# Patient Record
Sex: FEMALE | Race: WHITE | NOT HISPANIC OR LATINO | Employment: FULL TIME | URBAN - METROPOLITAN AREA
[De-identification: names, ages, dates, MRNs, and addresses within clinical notes are randomized per-mention and may not be internally consistent; named-entity substitution may affect disease eponyms.]

---

## 2018-07-28 ENCOUNTER — TELEPHONE (OUTPATIENT)
Dept: INTERNAL MEDICINE | Facility: CLINIC | Age: 33
End: 2018-07-28

## 2018-07-28 NOTE — TELEPHONE ENCOUNTER
PT CALLED IN REQUESTING TO LEAVE A MESSAGE FOR DR. ESPINAL OR LOLA MARTIN. THE PT HAS A NEW PT APT FOR DR. ESPINAL 08/14. THE PT IS WANTING TO SEE IF HER LAB ORDERS COULD BE DONE AT [facility] OR ANOTHER LOCATION TO HAVE THEM DONE PRIOR TO THE APT. PT ALSO WANTED TO SEE IN ADDITION TO THE LABS THAT ARE TO BE DRAWN, IF AN ORDER FOR C REACTIVE PROTEIN LEVEL COULD BE ADDED OR HOW TO GO ABOUT ADDING THAT LAB. BEST CALL BACK # [phone]

## 2018-08-14 ENCOUNTER — OFFICE VISIT (OUTPATIENT)
Dept: ENDOCRINOLOGY | Facility: CLINIC | Age: 33
End: 2018-08-14

## 2018-08-14 VITALS
HEART RATE: 56 BPM | WEIGHT: 163.8 LBS | HEIGHT: 65 IN | BODY MASS INDEX: 27.29 KG/M2 | DIASTOLIC BLOOD PRESSURE: 76 MMHG | OXYGEN SATURATION: 99 % | SYSTOLIC BLOOD PRESSURE: 116 MMHG

## 2018-08-14 DIAGNOSIS — R79.82 ELEVATED C-REACTIVE PROTEIN (CRP): ICD-10-CM

## 2018-08-14 DIAGNOSIS — E03.9 HYPOTHYROIDISM, ADULT: Primary | ICD-10-CM

## 2018-08-14 LAB
CRP SERPL-MCNC: 0.58 MG/DL (ref 0–1)
ERYTHROCYTE [SEDIMENTATION RATE] IN BLOOD: 14 MM/HR (ref 0–20)
T4 FREE SERPL-MCNC: 0.92 NG/DL (ref 0.89–1.76)
TSH SERPL DL<=0.05 MIU/L-ACNC: 2.94 MIU/ML (ref 0.35–5.35)

## 2018-08-14 PROCEDURE — 84439 ASSAY OF FREE THYROXINE: CPT | Performed by: INTERNAL MEDICINE

## 2018-08-14 PROCEDURE — 99204 OFFICE O/P NEW MOD 45 MIN: CPT | Performed by: INTERNAL MEDICINE

## 2018-08-14 PROCEDURE — 86140 C-REACTIVE PROTEIN: CPT | Performed by: INTERNAL MEDICINE

## 2018-08-14 PROCEDURE — 84443 ASSAY THYROID STIM HORMONE: CPT | Performed by: INTERNAL MEDICINE

## 2018-08-14 PROCEDURE — 85652 RBC SED RATE AUTOMATED: CPT | Performed by: INTERNAL MEDICINE

## 2018-08-14 PROCEDURE — 84480 ASSAY TRIIODOTHYRONINE (T3): CPT | Performed by: INTERNAL MEDICINE

## 2018-08-14 RX ORDER — FEXOFENADINE HCL 180 MG/1
180 TABLET ORAL DAILY
COMMUNITY

## 2018-08-14 NOTE — PROGRESS NOTES
Subjective:   Hypothyroidism (Initial )        Yasmeen Lacey is a 33 y.o. female who is here today as a self referral.   Hashimoto thyroiditis dx in 2010. She has seen DR Pickard, started levothyroxine  mcg and was on the same dose for a while.   Up until last year she took Levothyroxine 100 µg daily. She has seen Dr. Pickard and no changes in medication for several years. Last TSH was 0.96.   2017 she has switched to Naturethroid and later tp NP thyroid 90 mg.   She reported feeling best on Naturethroid.   Patient complains of fatigue, difficulty to loose weight despite exercising regularly and watching calories.     Her last visit with PCP ([facility]) she had elevated CRP and asked ot have this repeated.      Review of Systems  Review of Systems   Constitutional: Positive for fatigue and unexpected weight loss (difficulty to loose weight).   Gastrointestinal: Negative for constipation.   Genitourinary: Negative for menstrual problem.   Musculoskeletal: Negative for arthralgias.   All other systems reviewed and are negative.    Current medications:  Current Outpatient Prescriptions   Medication Sig Dispense Refill   • fexofenadine (ALLEGRA) 180 MG tablet Take 180 mg by mouth Daily.     • Thyroid (LEVOTHYROXINE-LIOTHYRONINE) 90 MG tablet        No current facility-administered medications for this visit.        The following portions of the patient's history were reviewed and updated as appropriate: She  has a past medical history of Hypothyroidism.  She  has no past surgical history on file.  Her family history includes Heart attack in her father and maternal grandmother; Hyperlipidemia in her father; Hypertension in her father and mother; Stroke in her maternal grandmother.  She  reports that she has quit smoking. She has never used smokeless tobacco. She reports that she drinks alcohol. She reports that she does not use drugs.  She has No Known Allergies..      Objective:     Vitals:    08/14/18 0836    BP: 116/76   Pulse: 56   SpO2: 99%   Body mass index is 27.26 kg/m².  Physical Exam   Constitutional: She is oriented to person, place, and time. She appears well-developed and well-nourished.   HENT:   Head: Normocephalic and atraumatic.   Eyes: Pupils are equal, round, and reactive to light. Conjunctivae and EOM are normal.   Neck: No thyromegaly present.   Cardiovascular: Normal rate, regular rhythm, normal heart sounds and intact distal pulses.    Pulmonary/Chest: Effort normal and breath sounds normal.   Musculoskeletal: She exhibits no edema.   Lymphadenopathy:     She has no cervical adenopathy.   Neurological: She is alert and oriented to person, place, and time.   Psychiatric: She has a normal mood and affect. Thought content normal.       LABS AND IMAGING  No results found for: TSH, [id number], A0YYNKO, THYROIDAB            Assessment:        1. Hypothyroidism, adult    2. Elevated C-reactive protein (CRP)      Diagnosis was discussed and reviewed with the patient including the advantages of drug therapy.          Plan:        1. Continue NP THyroid for now amd repeat labs .Patient would prefer to move to the [facility] and I will send rx after review of labs to [facility]    We have discussed in details the nature of the thyroid disease, thyroid hormone action, optimal TSH goals of 0.5-3.5, method of administration and medication interactions.      2. Repeat CRP and sed rate. She has no sx to suggest inflammatory process.   3. Patient will return to our office in 12 months   4. The risks and benefits of my recommendations, as well as other treatment options were discussed with the patient today. Questions were answered.           30 min  of 45 min face-to-face visit time spent for coordination of care and counselling regarding identified problems as outlined in the objective, assessment and discussion portions of the documentation.

## 2018-08-15 LAB — T3 SERPL-MCNC: 100 NG/DL (ref 71–180)

## 2018-08-21 ENCOUNTER — TELEPHONE (OUTPATIENT)
Dept: ENDOCRINOLOGY | Facility: CLINIC | Age: 33
End: 2018-08-21

## 2018-08-21 NOTE — TELEPHONE ENCOUNTER
----- Message from Sandra SHEPHERD MD sent at 8/20/2018  9:37 PM EDT -----  Regarding: FW: Prescription Question  Contact: [phone]      ----- Message -----  From: Yasmeen Lacey  Sent: 8/16/2018   6:50 PM  To: Sandra SHEPHERD MD  Subject: Prescription Question                            I was wondering if the new prescription of Naturethroid could be sent to [facility] but also if one, 30-day script could be sent to [facility] on [address]? [facility] can take a couple weeks, and I am low on medication now. Thank you!

## 2018-12-06 ENCOUNTER — TELEPHONE (OUTPATIENT)
Dept: ENDOCRINOLOGY | Facility: CLINIC | Age: 33
End: 2018-12-06

## 2018-12-06 NOTE — TELEPHONE ENCOUNTER
PATIENT IS CALLING BECAUSE HER MEDICATION NATURE BERKLEY IS ON BACK ORDER, AND WANTS TO KNOW WHAT SHE NEEDS TO DO, LAST TIME THIS HAPPENED THEY WERE OUT FOR  QUITE A WHILE.  SHE ONLY HAS A WEEK LEFT.  HER PHARMACY IS [facility]. PATIENT CAN BE REACHED [phone]

## 2018-12-07 ENCOUNTER — TELEPHONE (OUTPATIENT)
Dept: INTERNAL MEDICINE | Facility: CLINIC | Age: 33
End: 2018-12-07

## 2018-12-07 NOTE — TELEPHONE ENCOUNTER
The easest is to change to NP thyroid 90 mg. If she prefers to continue Naturethroid, then she needs to call pharmacies and ask if they have it. There eis [facility] pharmacy in [facility] we can sen rx to. They have naturethroid and it could be shipped. (Give her info on that pharmacy if she doesn't want to change to nP.

## 2018-12-07 NOTE — TELEPHONE ENCOUNTER
PT CALLED AND STATED THAT THE PHARMACY IN [place] HAS NOT RECEIVED PRESCRIPTION; CAN YOU PLEASE RESEND?

## 2018-12-07 NOTE — TELEPHONE ENCOUNTER
Spoke with patient she stated she would prefer to try the [facility] pharmacy first, should they not be able to fill due to availability, she would switch.  Rx was sent to [facility] pharmacy

## 2018-12-11 ENCOUNTER — TELEPHONE (OUTPATIENT)
Dept: INTERNAL MEDICINE | Facility: CLINIC | Age: 33
End: 2018-12-11

## 2018-12-11 NOTE — TELEPHONE ENCOUNTER
WALT FROM [facility] CALLED TO ASKED DR ESPINAL IF IT WAS OKAY TO CHANGE THE PATIENT'S DOSAGE ON HER THYROID MEDICATION FROM 97.5 MG TO TAKE TWO 48 MG SO THAT IT CAN EQUAL OUT AS ONE DUE TO THEM BEING OUT OF THE 97.5 MG? WALT WOULD LIKE TO GET A CALL BACK [phone]

## 2019-08-14 ENCOUNTER — OFFICE VISIT (OUTPATIENT)
Dept: ENDOCRINOLOGY | Facility: CLINIC | Age: 34
End: 2019-08-14

## 2019-08-14 VITALS
OXYGEN SATURATION: 99 % | HEART RATE: 63 BPM | DIASTOLIC BLOOD PRESSURE: 70 MMHG | WEIGHT: 180.8 LBS | HEIGHT: 65 IN | BODY MASS INDEX: 30.12 KG/M2 | SYSTOLIC BLOOD PRESSURE: 110 MMHG

## 2019-08-14 DIAGNOSIS — E03.9 HYPOTHYROIDISM, ADULT: Primary | ICD-10-CM

## 2019-08-14 DIAGNOSIS — R63.5 WEIGHT GAIN: ICD-10-CM

## 2019-08-14 PROCEDURE — 99213 OFFICE O/P EST LOW 20 MIN: CPT | Performed by: INTERNAL MEDICINE

## 2019-08-14 RX ORDER — NORETHINDRONE ACETATE AND ETHINYL ESTRADIOL, AND FERROUS FUMARATE 1MG-20(24)
KIT ORAL
COMMUNITY

## 2019-08-14 NOTE — PROGRESS NOTES
Subjective:   Hypothyroidism (Follow UP)        Yasmeen Lacey is a 34 y.o. female who is here today as a self referral.   Hashimoto thyroiditis dx in 2010. She has seen DR Pickard, started levothyroxine  mcg and was on the same dose for a while.   Up until last year she took Levothyroxine 100 µg daily. She has seen Dr. Pickard and no changes in medication for several years. Last TSH was 1.45.   2017 she has switched to Naturethroid 97.5, doing well.    She reported feeling best on Naturethroid.   Patient complains of fatigue, difficulty to loose weight despite exercising regularly and watching calories.     Her last visit with PCP ([facility]) results were reviewed.     Review of Systems  Review of Systems   Constitutional: Positive for fatigue and unexpected weight loss (difficulty to loose weight).   Gastrointestinal: Negative for constipation.   Genitourinary: Negative for menstrual problem.   Musculoskeletal: Negative for arthralgias.   All other systems reviewed and are negative.    Current medications:  Current Outpatient Medications   Medication Sig Dispense Refill   • fexofenadine (ALLEGRA) 180 MG tablet Take 180 mg by mouth Daily.     • Norethin Ace-Eth Estrad-FE 1-20 MG-MCG(24) capsule Taytulla 1 mg-20 mcg (24)/75 mg (4) capsule   Take 1 capsule every day by oral route.     • Thyroid (THYROIDTHROID) 97.5 MG PO tablet TAKE ONE TABLET (97.5mg) BY MOUTH EVERY DAY ON AN EMPTY STOMACH 90 tablet 3     No current facility-administered medications for this visit.        The following portions of the patient's history were reviewed and updated as appropriate: She  has a past medical history of Hypothyroidism.  She  has no past surgical history on file.  Her family history includes Heart attack in her father and maternal grandmother; Hyperlipidemia in her father; Hypertension in her father and mother; Stroke in her maternal grandmother.  She  reports that she has quit smoking. She has never used smokeless  tobacco. She reports that she drinks alcohol. She reports that she does not use drugs.  She has No Known Allergies..      Objective:     Vitals:    08/14/19 0902   BP: 110/70   Pulse: 63   SpO2: 99%   Body mass index is 30.09 kg/m².  Physical Exam   Constitutional: She is oriented to person, place, and time. She appears well-developed and well-nourished.   HENT:   Head: Normocephalic and atraumatic.   Eyes: Conjunctivae are normal.   Neck: No thyromegaly present.   Cardiovascular: Normal rate, regular rhythm, normal heart sounds and intact distal pulses.   Pulmonary/Chest: Effort normal and breath sounds normal.   Musculoskeletal: She exhibits no edema.   Lymphadenopathy:     She has no cervical adenopathy.   Neurological: She is alert and oriented to person, place, and time.   Psychiatric: She has a normal mood and affect. Thought content normal.       LABS AND IMAGING  Lab Results   Component Value Date    TSH 2.938 08/14/2018    [id number] 100 08/14/2018               Assessment:        1. Hypothyroidism, adult    2. Weight gain           Plan:         Continue NP THyroid for now and repeat labs .    We have discussed in details the nature of the thyroid disease, thyroid hormone action, optimal TSH goals of 0.5-3.5, method of administration and medication interactions.    Advised to continue with calorie monitoring and exercise. Perhaps decreasing calorie to 1400 or 1200 will have to overcome the plato effect  Follow-up in 1 year. I have offered the patient to see me every other year if sx are stable. She sees DR Be every year for physical.

## 2020-08-31 ENCOUNTER — TELEPHONE (OUTPATIENT)
Dept: ENDOCRINOLOGY | Facility: CLINIC | Age: 35
End: 2020-08-31

## 2020-09-02 ENCOUNTER — TELEPHONE (OUTPATIENT)
Dept: ENDOCRINOLOGY | Facility: CLINIC | Age: 35
End: 2020-09-02

## 2020-09-02 NOTE — TELEPHONE ENCOUNTER
PATIENT STATES THAT SHE WAS NOTIFIED BY HER PHARMACY THAT NATURE RADHAID HAS BEEN RECALLED AND WAS ASKED TO CONTACT HER PROVIDER TO GET AN ALTERNATIVE PRESCRIBED. NP THYROID OR GABBY THYROID WAS SUGGESTED TO THE PATIENT.

## 2020-09-03 ENCOUNTER — TELEPHONE (OUTPATIENT)
Dept: ENDOCRINOLOGY | Facility: CLINIC | Age: 35
End: 2020-09-03

## 2020-09-03 NOTE — TELEPHONE ENCOUNTER
Yasmeen called and said the thyroid is on recall and wanted something different, and [facility]

## 2020-09-04 DIAGNOSIS — E03.9 HYPOTHYROIDISM, ADULT: Primary | ICD-10-CM

## 2020-09-04 RX ORDER — LEVOTHYROXINE AND LIOTHYRONINE 57; 13.5 UG/1; UG/1
90 TABLET ORAL DAILY
Qty: 30 TABLET | Refills: 0 | Status: SHIPPED | OUTPATIENT
Start: 2020-09-04 | End: 2020-10-05

## 2020-09-04 NOTE — TELEPHONE ENCOUNTER
Sent script for armour thyroid 90 mg, which is the equivalent dose to what she was taking previously.

## 2020-09-04 NOTE — TELEPHONE ENCOUNTER
Patient requesting refill, pt has not been seen since 08/19 but has an appointment for 11/17/2020.  Patient states, this was the first available appointment with Dr. Mcduffie    Patient states, she spoke with her pharmacy and her med, Thyroid 97.5 has been recall.    Patient would like to another med, since her thryoid medication has been recalled.    Dr. Montgomery, please advise on refill med, pt is out of medication.  Thank you.    Med needs to be sent to [facility] in [place] on [address].

## 2020-09-16 ENCOUNTER — PRIOR AUTHORIZATION (OUTPATIENT)
Dept: ENDOCRINOLOGY | Facility: CLINIC | Age: 35
End: 2020-09-16

## 2020-09-16 NOTE — TELEPHONE ENCOUNTER
PA# [id number] was sent to [facility] for Thyroid [place] 90 mg tab.    Received a message from insurance:  Available without authorization.

## 2020-10-03 DIAGNOSIS — E03.9 HYPOTHYROIDISM, ADULT: ICD-10-CM

## 2020-10-05 RX ORDER — THYROID,PORK 90 MG
TABLET ORAL
Qty: 30 TABLET | Refills: 2 | Status: SHIPPED | OUTPATIENT
Start: 2020-10-05 | End: 2020-11-17

## 2020-11-17 ENCOUNTER — OFFICE VISIT (OUTPATIENT)
Dept: ENDOCRINOLOGY | Facility: CLINIC | Age: 35
End: 2020-11-17

## 2020-11-17 ENCOUNTER — LAB (OUTPATIENT)
Dept: LAB | Facility: HOSPITAL | Age: 35
End: 2020-11-17

## 2020-11-17 VITALS
OXYGEN SATURATION: 98 % | SYSTOLIC BLOOD PRESSURE: 110 MMHG | HEIGHT: 65 IN | WEIGHT: 181.1 LBS | HEART RATE: 62 BPM | BODY MASS INDEX: 30.17 KG/M2 | TEMPERATURE: 97.3 F | DIASTOLIC BLOOD PRESSURE: 60 MMHG

## 2020-11-17 DIAGNOSIS — E03.9 HYPOTHYROIDISM, ADULT: Primary | ICD-10-CM

## 2020-11-17 PROCEDURE — 90686 IIV4 VACC NO PRSV 0.5 ML IM: CPT | Performed by: INTERNAL MEDICINE

## 2020-11-17 PROCEDURE — 90471 IMMUNIZATION ADMIN: CPT | Performed by: INTERNAL MEDICINE

## 2020-11-17 PROCEDURE — 84443 ASSAY THYROID STIM HORMONE: CPT | Performed by: INTERNAL MEDICINE

## 2020-11-17 PROCEDURE — 84480 ASSAY TRIIODOTHYRONINE (T3): CPT | Performed by: INTERNAL MEDICINE

## 2020-11-17 PROCEDURE — 99213 OFFICE O/P EST LOW 20 MIN: CPT | Performed by: INTERNAL MEDICINE

## 2020-11-17 PROCEDURE — 84439 ASSAY OF FREE THYROXINE: CPT | Performed by: INTERNAL MEDICINE

## 2020-11-17 RX ORDER — LEVOTHYROXINE, LIOTHYRONINE 57; 13.5 UG/1; UG/1
90 TABLET ORAL DAILY
Qty: 30 TABLET | Refills: 11 | Status: SHIPPED | OUTPATIENT
Start: 2020-11-17 | End: 2021-09-13 | Stop reason: SDUPTHER

## 2020-11-17 RX ORDER — ESCITALOPRAM OXALATE 10 MG/1
TABLET ORAL
COMMUNITY
Start: 2020-11-05

## 2020-11-17 NOTE — PROGRESS NOTES
Subjective:   Hypothyroidism (Follow UP)        Yasmeen Lacey is a 35 y.o. female who is here today as a self referral.   Hashimoto thyroiditis dx in 2010. She has seen DR Pickard, started levothyroxine  2017 she has switched to Naturethroid 97.5 with improved symptoms.   AFter it was discontinued she has changed to [place] thyroid.  No change in sx.        Review of Systems  Review of Systems   Constitutional: Positive for fatigue and unexpected weight loss (difficulty to loose weight).   Gastrointestinal: Negative for constipation.   Genitourinary: Negative for menstrual problem.   Musculoskeletal: Negative for arthralgias.   All other systems reviewed and are negative.    Current medications:  Current Outpatient Medications   Medication Sig Dispense Refill   • escitalopram (LEXAPRO) 10 MG tablet TK 1 T PO QD UNTIL DIRECTED STOP     • fexofenadine (ALLEGRA) 180 MG tablet Take 180 mg by mouth Daily.     • Norethin Ace-Eth Estrad-FE 1-20 MG-MCG(24) capsule Taytulla 1 mg-20 mcg (24)/75 mg (4) capsule   Take 1 capsule every day by oral route.     • NP Thyroid 90 MG tablet Take 1 tablet by mouth Daily. 30 tablet 11     No current facility-administered medications for this visit.        The following portions of the patient's history were reviewed and updated as appropriate: She  has a past medical history of Hypothyroidism.  She  has no past surgical history on file.  Her family history includes Heart attack in her father and maternal grandmother; Hyperlipidemia in her father; Hypertension in her father and mother; Stroke in her maternal grandmother.  She  reports that she has quit smoking. She has never used smokeless tobacco. She reports current alcohol use. She reports that she does not use drugs.  She has No Known Allergies..      Objective:     Vitals:    11/17/20 1424   BP: 110/60   Pulse: 62   Temp: 97.3 °F (36.3 °C)   SpO2: 98%   Body mass index is 30.14 kg/m².  Physical Exam   Constitutional: She is oriented to  person, place, and time. She appears well-developed and well-nourished.   HENT:   Head: Normocephalic and atraumatic.   Eyes: Conjunctivae are normal.   Neck: No thyromegaly present.   Cardiovascular: Normal rate, regular rhythm and normal heart sounds.   Pulmonary/Chest: Effort normal and breath sounds normal.   Lymphadenopathy:     She has no cervical adenopathy.   Neurological: She is alert and oriented to person, place, and time.   Psychiatric: Thought content normal.       LABS AND IMAGING  Lab Results   Component Value Date    TSH 2.938 08/14/2018    [id number] 100 08/14/2018               Assessment:        1. Hypothyroidism, adult           Plan:         Continue NP Thyroid 90 mg and repeat labs .   Flu vaccine administered   Follow-up in 1 year. I have offered the patient to see me every other year if sx are stable. She sees DR Be every year for physical.

## 2020-11-18 LAB
T3 SERPL-MCNC: 136 NG/DL (ref 80–200)
T4 FREE SERPL-MCNC: 0.99 NG/DL (ref 0.93–1.7)
TSH SERPL DL<=0.05 MIU/L-ACNC: 1.9 UIU/ML (ref 0.27–4.2)

## 2020-11-23 ENCOUNTER — TELEPHONE (OUTPATIENT)
Dept: ENDOCRINOLOGY | Facility: CLINIC | Age: 35
End: 2020-11-23

## 2020-11-23 NOTE — TELEPHONE ENCOUNTER
I have received a call from patient needing a refill. I have send a refill to the requested pharmacy, please change the dose or location if incorrect.

## 2021-06-01 ENCOUNTER — TELEPHONE (OUTPATIENT)
Dept: ENDOCRINOLOGY | Facility: CLINIC | Age: 36
End: 2021-06-01

## 2021-06-01 NOTE — TELEPHONE ENCOUNTER
PT CALLED STATING THAT HER DOSE OF NP THYROID HAS BEEN RECALLED. I ENCOURAGED HER TO REACH OUT TO HER PHARM IN REGARDS TO THIS. IF WE NEED TO REACH TO THE PT, PLEASE DO. THANK YOU

## 2021-09-13 NOTE — TELEPHONE ENCOUNTER
PATIENT IS GOING TO BE SCHEDULED TO SEE DR. ESPINAL ON 12/10/21  PM. SHE NEEDS [facility] T[facility] NEXT APPT

## 2021-09-14 RX ORDER — LEVOTHYROXINE, LIOTHYRONINE 57; 13.5 UG/1; UG/1
90 TABLET ORAL DAILY
Qty: 30 TABLET | Refills: 2 | Status: SHIPPED | OUTPATIENT
Start: 2021-09-14

## 2022-01-03 RX ORDER — LEVOTHYROXINE, LIOTHYRONINE 57; 13.5 UG/1; UG/1
90 TABLET ORAL DAILY
Qty: 30 TABLET | Refills: 2 | OUTPATIENT
Start: 2022-01-03

## 2022-12-07 ENCOUNTER — TRANSCRIBE ORDERS (OUTPATIENT)
Dept: DIABETES SERVICES | Facility: HOSPITAL | Age: 37
End: 2022-12-07

## 2022-12-07 DIAGNOSIS — O24.410 DIET CONTROLLED GESTATIONAL DIABETES MELLITUS (GDM), ANTEPARTUM: Primary | ICD-10-CM
